# Patient Record
(demographics unavailable — no encounter records)

---

## 2018-12-21 NOTE — BD
DEXA SCAN:

 

12/21/2018

 

PROVIDED CLINICAL HISTORY:

Osteoporosis.

 

FINDINGS:

LUMBAR SPINE     BMD (g/cm2)     T-SCORE

L1               0.843           -1.3

L2               0.872           -1.4

L3               1.119            0.3

L4               1.125            0.6

L1-L4            1.001           -0.4

 

NECK             0.742           -1.0

TOTAL            0.954            0.1

 

IMPRESSION:

Calculated bone mineral density meets WHO criteria for normal.

 

POS: ISELA

## 2019-12-23 NOTE — MMO
Bilateral MAMMO Bilat Screen [facility].

 

CLINICAL HISTORY:

Patient is 83 years old and is seen for screening. The patient has no family

history of breast cancer.   The patient has a history of left Excisional Biopsy

in February, 1997, left Mastectomy in 1997 - Pt. has been on Tamoxifen for 6.5

yrs. and left Mastectomy - 1.

 

VIEWS:

The views performed were:  bilateral craniocaudal with tomosynthesis and

bilateral mediolateral oblique with tomosynthesis.

 

FILMS COMPARED:

The present examination has been compared to prior imaging studies performed at

[facility] on 12/18/2015, 12/19/2016, 12/20/2017 and 12/21/2018.

 

This study has been interpreted with the assistance of computer-aided detection.

 

MAMMOGRAM FINDINGS:

There are no suspicious masses, suspicious calcifications, or new areas of

architectural distortion.

 

IMPRESSION:

THERE IS NO MAMMOGRAPHIC EVIDENCE OF MALIGNANCY.

 

A ROUTINE FOLLOW-UP MAMMOGRAM IN 1 YEAR IS RECOMMENDED.

 

THE RESULTS OF THIS EXAM WERE SENT TO THE PATIENT.

 

ACR BI-RADS Category 1 - Negative

 

MAMMOGRAPHY NOTE:

 1. A negative mammogram report should not delay a biopsy if a dominant of

 clinically suspicious mass is present.

 2. Approximately 10% to 15% of breast cancers are not detected by

 mammography.

 3. Adenosis and dense breasts may obscure an underlying neoplasm.

 

 

Reported by: KAE WINSTON MD

Electonically Signed: [id number]

## 2021-01-04 NOTE — MMO
Bilateral MAMMO Bilat Screen [facility].

 

CLINICAL HISTORY:

Patient is 84 years old and is seen for screening. The patient has no family

history of breast cancer.   The patient has a history of left Excisional Biopsy

in February, 1997, left Mastectomy in 1997 - Pt. has been on Tamoxifen for 6.5

yrs. and left Mastectomy - 1.

 

VIEWS:

The views performed were:  right craniocaudal with tomosynthesis and right

mediolateral oblique with tomosynthesis.

 

FILMS COMPARED:

The present examination has been compared to prior imaging studies performed at

[facility] on 12/19/2016, 12/20/2017, 12/21/2018 and 12/23/2019.

 

This study has been interpreted with the assistance of computer-aided detection.

 

MAMMOGRAM FINDINGS:

There are scattered fibroglandular densities.

 

There are no suspicious masses, suspicious calcifications, or new areas of

architectural distortion.

 

IMPRESSION:

THERE IS NO MAMMOGRAPHIC EVIDENCE OF MALIGNANCY.

 

A ROUTINE FOLLOW-UP MAMMOGRAM IN 1 YEAR IS RECOMMENDED.

 

THE RESULTS OF THIS EXAM WERE SENT TO THE PATIENT.

 

ACR BI-RADS Category 1 - Negative

 

MAMMOGRAPHY NOTE:

 1. A negative mammogram report should not delay a biopsy if a dominant of

 clinically suspicious mass is present.

 2. Approximately 10% to 15% of breast cancers are not detected by

 mammography.

 3. Adenosis and dense breasts may obscure an underlying neoplasm.

 

 

Reported by: TYLER DAY MD

Electonically Signed: [id number]